# Patient Record
Sex: FEMALE | Race: WHITE | HISPANIC OR LATINO | URBAN - METROPOLITAN AREA
[De-identification: names, ages, dates, MRNs, and addresses within clinical notes are randomized per-mention and may not be internally consistent; named-entity substitution may affect disease eponyms.]

---

## 2022-06-17 ENCOUNTER — OFFICE (OUTPATIENT)
Dept: URBAN - METROPOLITAN AREA CLINIC 63 | Facility: CLINIC | Age: 67
Setting detail: OPHTHALMOLOGY
End: 2022-06-17
Payer: MEDICARE

## 2022-06-17 DIAGNOSIS — D31.31: ICD-10-CM

## 2022-06-17 DIAGNOSIS — H40.053: ICD-10-CM

## 2022-06-17 PROCEDURE — 92004 COMPRE OPH EXAM NEW PT 1/>: CPT | Performed by: SPECIALIST

## 2022-06-17 PROCEDURE — 92134 CPTRZ OPH DX IMG PST SGM RTA: CPT | Performed by: SPECIALIST

## 2022-06-17 ASSESSMENT — CONFRONTATIONAL VISUAL FIELD TEST (CVF)
OS_FINDINGS: FULL
OD_FINDINGS: FULL

## 2022-06-17 ASSESSMENT — REFRACTION_AUTOREFRACTION
OD_CYLINDER: -0.50
OS_AXIS: 175
OS_SPHERE: +2.00
OS_CYLINDER: -1.00
OD_AXIS: 85
OD_SPHERE: +2.25

## 2022-06-17 ASSESSMENT — REFRACTION_CURRENTRX
OD_OVR_VA: 20/
OD_SPHERE: +2.25
OS_OVR_VA: 20/
OS_AXIS: 154
OD_CYLINDER: -0.25
OS_CYLINDER: -0.50
OS_SPHERE: +1.75
OD_AXIS: 70

## 2022-06-17 ASSESSMENT — SPHEQUIV_DERIVED
OD_SPHEQUIV: 2
OS_SPHEQUIV: 1.5

## 2022-06-17 ASSESSMENT — TONOMETRY
OS_IOP_MMHG: 19
OD_IOP_MMHG: 19

## 2022-06-17 ASSESSMENT — VISUAL ACUITY
OD_BCVA: 20/20
OS_BCVA: 20/20

## 2022-07-11 ENCOUNTER — OFFICE (OUTPATIENT)
Dept: URBAN - METROPOLITAN AREA CLINIC 94 | Facility: CLINIC | Age: 67
Setting detail: OPHTHALMOLOGY
End: 2022-07-11
Payer: MEDICARE

## 2022-07-11 DIAGNOSIS — D31.31: ICD-10-CM

## 2022-07-11 PROCEDURE — 92012 INTRM OPH EXAM EST PATIENT: CPT | Performed by: SPECIALIST

## 2022-07-11 ASSESSMENT — TONOMETRY
OS_IOP_MMHG: 16
OD_IOP_MMHG: 19

## 2022-07-11 ASSESSMENT — REFRACTION_AUTOREFRACTION
OS_SPHERE: +2.00
OS_AXIS: 175
OD_CYLINDER: -0.50
OS_CYLINDER: -1.00
OD_SPHERE: +2.25
OD_AXIS: 85

## 2022-07-11 ASSESSMENT — VISUAL ACUITY
OS_BCVA: 20/20
OD_BCVA: 20/20

## 2022-07-11 ASSESSMENT — SPHEQUIV_DERIVED
OD_SPHEQUIV: 2
OS_SPHEQUIV: 1.5

## 2023-05-01 ENCOUNTER — APPOINTMENT (OUTPATIENT)
Dept: ORTHOPEDIC SURGERY | Facility: CLINIC | Age: 68
End: 2023-05-01

## 2023-05-01 DIAGNOSIS — M25.562 PAIN IN LEFT KNEE: ICD-10-CM

## 2023-05-02 ENCOUNTER — APPOINTMENT (OUTPATIENT)
Dept: ORTHOPEDIC SURGERY | Facility: CLINIC | Age: 68
End: 2023-05-02
Payer: MEDICARE

## 2023-05-02 VITALS
HEART RATE: 63 BPM | BODY MASS INDEX: 19.49 KG/M2 | DIASTOLIC BLOOD PRESSURE: 67 MMHG | WEIGHT: 110 LBS | HEIGHT: 63 IN | SYSTOLIC BLOOD PRESSURE: 146 MMHG

## 2023-05-02 DIAGNOSIS — S83.249A OTHER TEAR OF MEDIAL MENISCUS, CURRENT INJURY, UNSPECIFIED KNEE, INITIAL ENCOUNTER: ICD-10-CM

## 2023-05-02 PROCEDURE — 99203 OFFICE O/P NEW LOW 30 MIN: CPT

## 2023-05-02 PROCEDURE — 73564 X-RAY EXAM KNEE 4 OR MORE: CPT | Mod: LT

## 2023-05-02 NOTE — HISTORY OF PRESENT ILLNESS
[Improving] : improving [Intermit.] : ~He/She~ states the symptoms seem to be intermittent [Running] : worsened by running [Walking] : worsened by walking [Acetaminophen] : relieved by acetaminophen [NSAIDs] : relieved by nonsteroidal anti-inflammatory drugs [Physical Therapy] : relieved by physical therapy [de-identified] : 7-year-old female with past medical history of anxiety, thyroid disease, hyperlipidemia presents to the office for initial evaluation of left knee pain x2 weeks.  The patient states her pain initially started in her left calf while running in March but about 2 weeks ago she was running up the stairs and twisted her left knee.  He had severe pain with weightbearing and walking so she used crutches for 2 days.  Her pain is located on the lateral aspect of the knee.  She has been taking Tylenol and anti-inflammatories for the pain with good relief.  States that she was attending physical therapy for her calf pain but when she injured her knee on the stairs she stopped attending.  Her pain has significantly improved since the initial injury.  She has not had any injections or surgeries on the knee.  She did cancel a trip to [facility] about 2 weeks ago because of the injury and would like to attempt that trip if cleared.  Denies any locking catching or buckling of the knee, swelling of the knee, numbness/tingling lower extremity, radiation of pain to the hip or back.\par \par Denies a history of smoking drug or alcohol use.

## 2023-05-02 NOTE — ADDENDUM
[FreeTextEntry1] : This note was written by Tatiana Maradiaga, acting as the  for Dr. Benoit. This note accurately reflects the work and decisions made by Dr. Benoit. [employer]

## 2023-05-02 NOTE — PHYSICAL EXAM
[de-identified] : GENERAL APPEARANCE: Well nourished and hydrated, pleasant, alert, and oriented x 3. Appears their stated age. \par HEENT: Normocephalic, extraocular eye motion intact. Nasal septum midline. Oral cavity clear. External auditory canal clear. \par RESPIRATORY: Breath sounds clear and audible in all lobes. No wheezing, No accessory muscle use.\par CARDIOVASCULAR: No apparent abnormalities. No lower leg edema. No varicosities. Pedal pulses are palpable.\par NEUROLOGIC: Sensation is normal, no muscle weakness in the upper or lower extremities.\par DERMATOLOGIC: No apparent skin lesions, moist, warm, no rash.\par SPINE: Cervical spine appears normal and moves freely; thoracic spine appears normal and moves freely; lumbosacral spine appears normal and moves freely, normal, nontender.\par MUSCULOSKELETAL: Hands, wrists, and elbows are normal and move freely, shoulders are normal and move freely. \par Psychiatric: Oriented to person, place, and time, insight and judgement were intact and the affect was normal.\par \par Musculoskeletal:\par Left knee exam shows mild effusion, ROM is 0-1 30 degrees, no instability, no pain with Sindhu, and no joint line tenderness.  There is mild tenderness palpation over the distal IT band and proximal lateral calf\par 5/5 motor strength in bilateral lower extremities. Sensory: Intact in bilateral lower extremities. DTRs: Biceps, brachioradialis, triceps, patellar, ankle and plantar 2+ and symmetric bilaterally. Pulses: dorsalis pedis, posterior tibial, femoral, popliteal, and radial 2+ and symmetric bilaterally. [de-identified] : 4 views of the left knee obtained the office today show no acute fracture or dislocation.  No significant degenerative changes noted.\par \par MRI of the left calf dated 4/25/2023 shows oblique tear in the posterior horn of the medial meniscus moderate knee joint effusion no fracture or acute osseous injury no muscle tear or muscle edema

## 2023-05-02 NOTE — DISCUSSION/SUMMARY
[de-identified] : Patient is a 67-year-old female with left knee and calf pain presenting today for initial evaluation.  She is having very good improvement in her pain with conservative treatment.  I recommend she continue with that at this time.  Of note her pain is located laterally and she does have a small tear of her medial meniscus.  I do not think that that is what is causing her the issue at this time.  She should continue take her anti-inflammatories.  She will continue with low impact activity and exercises.  I will see her back on an as-needed basis for her left knee.  All questions were asked and answered

## 2023-05-17 ENCOUNTER — OFFICE (OUTPATIENT)
Dept: URBAN - METROPOLITAN AREA CLINIC 63 | Facility: CLINIC | Age: 68
Setting detail: OPHTHALMOLOGY
End: 2023-05-17
Payer: MEDICARE

## 2023-05-17 ENCOUNTER — APPOINTMENT (OUTPATIENT)
Dept: ORTHOPEDIC SURGERY | Facility: CLINIC | Age: 68
End: 2023-05-17

## 2023-05-17 DIAGNOSIS — D31.31: ICD-10-CM

## 2023-05-17 PROCEDURE — 92250 FUNDUS PHOTOGRAPHY W/I&R: CPT | Performed by: SPECIALIST

## 2023-05-17 PROCEDURE — 92014 COMPRE OPH EXAM EST PT 1/>: CPT | Performed by: SPECIALIST

## 2023-05-17 ASSESSMENT — REFRACTION_AUTOREFRACTION
OD_AXIS: 85
OS_AXIS: 175
OD_CYLINDER: -0.50
OS_CYLINDER: -1.00
OS_SPHERE: +2.00
OD_SPHERE: +2.25

## 2023-05-17 ASSESSMENT — VISUAL ACUITY
OS_BCVA: 20/20
OD_BCVA: 20/20

## 2023-05-17 ASSESSMENT — CONFRONTATIONAL VISUAL FIELD TEST (CVF)
OD_FINDINGS: FULL
OS_FINDINGS: FULL

## 2023-05-17 ASSESSMENT — SPHEQUIV_DERIVED
OS_SPHEQUIV: 1.5
OD_SPHEQUIV: 2

## 2023-08-07 ENCOUNTER — RESULT CHARGE (OUTPATIENT)
Age: 68
End: 2023-08-07

## 2023-08-08 ENCOUNTER — APPOINTMENT (OUTPATIENT)
Dept: VASCULAR SURGERY | Facility: CLINIC | Age: 68
End: 2023-08-08
Payer: MEDICARE

## 2023-08-08 VITALS
HEART RATE: 60 BPM | DIASTOLIC BLOOD PRESSURE: 77 MMHG | WEIGHT: 110 LBS | BODY MASS INDEX: 19.49 KG/M2 | SYSTOLIC BLOOD PRESSURE: 134 MMHG | HEIGHT: 63 IN

## 2023-08-08 DIAGNOSIS — I25.10 ATHEROSCLEROTIC HEART DISEASE OF NATIVE CORONARY ARTERY W/OUT ANGINA PECTORIS: ICD-10-CM

## 2023-08-08 DIAGNOSIS — F41.9 ANXIETY DISORDER, UNSPECIFIED: ICD-10-CM

## 2023-08-08 PROCEDURE — 99204 OFFICE O/P NEW MOD 45 MIN: CPT

## 2023-08-09 RX ORDER — ALPRAZOLAM 0.25 MG/1
0.25 TABLET ORAL
Qty: 30 | Refills: 0 | Status: ACTIVE | COMMUNITY
Start: 2023-08-09 | End: 1900-01-01

## 2023-08-09 RX ORDER — LEVOTHYROXINE SODIUM 75 UG/1
75 CAPSULE ORAL
Refills: 0 | Status: ACTIVE | COMMUNITY

## 2023-08-09 RX ORDER — PSYLLIUM HUSK 0.4 G
CAPSULE ORAL
Refills: 0 | Status: ACTIVE | COMMUNITY

## 2023-08-09 RX ORDER — ASCORBIC ACID 500 MG
TABLET ORAL
Refills: 0 | Status: ACTIVE | COMMUNITY

## 2023-08-09 RX ORDER — CHROMIUM 200 MCG
TABLET ORAL
Refills: 0 | Status: ACTIVE | COMMUNITY

## 2023-08-09 RX ORDER — LORAZEPAM 0.5 MG/1
0.5 TABLET ORAL
Qty: 30 | Refills: 0 | Status: DISCONTINUED | COMMUNITY
Start: 2023-08-08 | End: 2023-08-09

## 2023-08-09 RX ORDER — ATORVASTATIN CALCIUM 10 MG/1
10 TABLET, FILM COATED ORAL
Refills: 0 | Status: ACTIVE | COMMUNITY

## 2023-08-09 RX ORDER — ALPRAZOLAM 2 MG/1
TABLET ORAL
Refills: 0 | Status: ACTIVE | COMMUNITY

## 2023-08-09 NOTE — HISTORY OF PRESENT ILLNESS
[FreeTextEntry1] : 67yoF PMHx of HLD, hypothyroidism, no smoking hx, referred by Dr. 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  Imaging also notes varices in the area.  Pt denies any gross hematura or abdominal pain at this time.

## 2023-08-09 NOTE — PHYSICAL EXAM
[JVD] : no jugular venous distention  [Normal Thyroid] : the thyroid was normal [Carotid Bruits] : no carotid bruits [Normal Breath Sounds] : Normal breath sounds [Respiratory Effort] : normal respiratory effort [Normal Heart Sounds] : normal heart sounds [Normal Rate and Rhythm] : normal rate and rhythm [Right Carotid Bruit] : no bruit heard over the right carotid [Left Carotid Bruit] : no bruit heard over the left carotid [2+] : left 2+ [Ankle Swelling (On Exam)] : not present [Abdomen Masses] : No abdominal masses [Abdomen Tenderness] : ~T ~M No abdominal tenderness [No Rash or Lesion] : No rash or lesion [Purpura] : no purpura  [Petechiae] : no petechiae [Skin Ulcer] : no ulcer [Skin Induration] : no induration [Alert] : alert [Calm] : calm [de-identified] : Healthy appearance, thin habitus [de-identified] : NC/AT, anicteric [de-identified] : No flank or CVA tenderness appreciated [de-identified] : FROM throughout, strength 5/5x4

## 2023-08-09 NOTE — ASSESSMENT
[FreeTextEntry1] : 67yoF PMHx of HLD, hypothyroidism, no smoking hx, referred by Dr. 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  Imaging also notes varices in the area.  Pt denies any gross hematura or abdominal pain at this time.  Discussed options for repair of pt's renal aneurysm/AVF, including endovascular treatment/embolization of the vessel v open repair/ligation.  Pt/ aware of risks/benefits of all options and is in agreement w/plan to evaluate the AVF w/renal angiogram, possible embolization.  Will schedule procedure in the cath lab for 2wks.

## 2023-08-23 ENCOUNTER — TRANSCRIPTION ENCOUNTER (OUTPATIENT)
Age: 68
End: 2023-08-23

## 2023-08-24 ENCOUNTER — TRANSCRIPTION ENCOUNTER (OUTPATIENT)
Age: 68
End: 2023-08-24

## 2023-08-24 ENCOUNTER — INPATIENT (INPATIENT)
Facility: HOSPITAL | Age: 68
LOS: 0 days | Discharge: HOME | DRG: 271 | End: 2023-08-25
Attending: SURGERY | Admitting: SURGERY
Payer: MEDICARE

## 2023-08-24 VITALS — HEIGHT: 63 IN

## 2023-08-24 LAB
ANION GAP SERPL CALC-SCNC: 6 MMOL/L — SIGNIFICANT CHANGE UP (ref 5–17)
BUN SERPL-MCNC: 16 MG/DL — SIGNIFICANT CHANGE UP (ref 7–23)
CALCIUM SERPL-MCNC: 9.2 MG/DL — SIGNIFICANT CHANGE UP (ref 8.4–10.5)
CHLORIDE SERPL-SCNC: 106 MMOL/L — SIGNIFICANT CHANGE UP (ref 96–108)
CO2 SERPL-SCNC: 28 MMOL/L — SIGNIFICANT CHANGE UP (ref 22–31)
CREAT SERPL-MCNC: 0.83 MG/DL — SIGNIFICANT CHANGE UP (ref 0.5–1.3)
EGFR: 77 ML/MIN/1.73M2 — SIGNIFICANT CHANGE UP
GLUCOSE SERPL-MCNC: 93 MG/DL — SIGNIFICANT CHANGE UP (ref 70–99)
HCT VFR BLD CALC: 38.4 % — SIGNIFICANT CHANGE UP (ref 34.5–45)
HGB BLD-MCNC: 12.3 G/DL — SIGNIFICANT CHANGE UP (ref 11.5–15.5)
MAGNESIUM SERPL-MCNC: 2 MG/DL — SIGNIFICANT CHANGE UP (ref 1.6–2.6)
MCHC RBC-ENTMCNC: 29.9 PG — SIGNIFICANT CHANGE UP (ref 27–34)
MCHC RBC-ENTMCNC: 32 GM/DL — SIGNIFICANT CHANGE UP (ref 32–36)
MCV RBC AUTO: 93.4 FL — SIGNIFICANT CHANGE UP (ref 80–100)
NRBC # BLD: 0 /100 WBCS — SIGNIFICANT CHANGE UP (ref 0–0)
PHOSPHATE SERPL-MCNC: 3.9 MG/DL — SIGNIFICANT CHANGE UP (ref 2.5–4.5)
PLATELET # BLD AUTO: 170 K/UL — SIGNIFICANT CHANGE UP (ref 150–400)
POTASSIUM SERPL-MCNC: 4.2 MMOL/L — SIGNIFICANT CHANGE UP (ref 3.5–5.3)
POTASSIUM SERPL-SCNC: 4.2 MMOL/L — SIGNIFICANT CHANGE UP (ref 3.5–5.3)
RBC # BLD: 4.11 M/UL — SIGNIFICANT CHANGE UP (ref 3.8–5.2)
RBC # FLD: 13.7 % — SIGNIFICANT CHANGE UP (ref 10.3–14.5)
SODIUM SERPL-SCNC: 140 MMOL/L — SIGNIFICANT CHANGE UP (ref 135–145)
WBC # BLD: 6.02 K/UL — SIGNIFICANT CHANGE UP (ref 3.8–10.5)
WBC # FLD AUTO: 6.02 K/UL — SIGNIFICANT CHANGE UP (ref 3.8–10.5)

## 2023-08-24 PROCEDURE — 36010 PLACE CATHETER IN VEIN: CPT | Mod: GC,LT

## 2023-08-24 PROCEDURE — 76937 US GUIDE VASCULAR ACCESS: CPT | Mod: 26,GC

## 2023-08-24 PROCEDURE — 36247 INS CATH ABD/L-EXT ART 3RD: CPT | Mod: GC,LT

## 2023-08-24 PROCEDURE — 37242 VASC EMBOLIZE/OCCLUDE ARTERY: CPT | Mod: GC

## 2023-08-24 RX ORDER — ATORVASTATIN CALCIUM 80 MG/1
1 TABLET, FILM COATED ORAL
Refills: 0 | DISCHARGE

## 2023-08-24 RX ORDER — OXYCODONE AND ACETAMINOPHEN 5; 325 MG/1; MG/1
2 TABLET ORAL EVERY 6 HOURS
Refills: 0 | Status: DISCONTINUED | OUTPATIENT
Start: 2023-08-24 | End: 2023-08-25

## 2023-08-24 RX ORDER — ACETAMINOPHEN 500 MG
2 TABLET ORAL
Qty: 0 | Refills: 0 | DISCHARGE
Start: 2023-08-24

## 2023-08-24 RX ORDER — CHLORHEXIDINE GLUCONATE 213 G/1000ML
1 SOLUTION TOPICAL ONCE
Refills: 0 | Status: DISCONTINUED | OUTPATIENT
Start: 2023-08-24 | End: 2023-08-25

## 2023-08-24 RX ORDER — ATORVASTATIN CALCIUM 80 MG/1
10 TABLET, FILM COATED ORAL AT BEDTIME
Refills: 0 | Status: DISCONTINUED | OUTPATIENT
Start: 2023-08-24 | End: 2023-08-25

## 2023-08-24 RX ORDER — LEVOTHYROXINE SODIUM 125 MCG
75 TABLET ORAL DAILY
Refills: 0 | Status: DISCONTINUED | OUTPATIENT
Start: 2023-08-25 | End: 2023-08-25

## 2023-08-24 RX ORDER — OXYCODONE AND ACETAMINOPHEN 5; 325 MG/1; MG/1
1 TABLET ORAL EVERY 4 HOURS
Refills: 0 | Status: DISCONTINUED | OUTPATIENT
Start: 2023-08-24 | End: 2023-08-25

## 2023-08-24 RX ORDER — ALPRAZOLAM 0.25 MG
0.5 TABLET ORAL AT BEDTIME
Refills: 0 | Status: DISCONTINUED | OUTPATIENT
Start: 2023-08-24 | End: 2023-08-25

## 2023-08-24 RX ORDER — CEFAZOLIN SODIUM 1 G
1000 VIAL (EA) INJECTION EVERY 8 HOURS
Refills: 0 | Status: COMPLETED | OUTPATIENT
Start: 2023-08-24 | End: 2023-08-25

## 2023-08-24 RX ORDER — ALPRAZOLAM 0.25 MG
0.25 TABLET ORAL EVERY 6 HOURS
Refills: 0 | Status: DISCONTINUED | OUTPATIENT
Start: 2023-08-24 | End: 2023-08-24

## 2023-08-24 RX ORDER — LEVOTHYROXINE SODIUM 125 MCG
1 TABLET ORAL
Refills: 0 | DISCHARGE

## 2023-08-24 RX ORDER — ACETAMINOPHEN 500 MG
650 TABLET ORAL EVERY 6 HOURS
Refills: 0 | Status: DISCONTINUED | OUTPATIENT
Start: 2023-08-24 | End: 2023-08-25

## 2023-08-24 RX ORDER — SODIUM CHLORIDE 9 MG/ML
1000 INJECTION INTRAMUSCULAR; INTRAVENOUS; SUBCUTANEOUS
Refills: 0 | Status: DISCONTINUED | OUTPATIENT
Start: 2023-08-24 | End: 2023-08-24

## 2023-08-24 RX ORDER — APIXABAN 2.5 MG/1
10 TABLET, FILM COATED ORAL EVERY 12 HOURS
Refills: 0 | Status: DISCONTINUED | OUTPATIENT
Start: 2023-08-24 | End: 2023-08-25

## 2023-08-24 RX ORDER — ALPRAZOLAM 0.25 MG
1 TABLET ORAL
Refills: 0 | DISCHARGE

## 2023-08-24 RX ADMIN — Medication 100 MILLIGRAM(S): at 17:46

## 2023-08-24 RX ADMIN — APIXABAN 10 MILLIGRAM(S): 2.5 TABLET, FILM COATED ORAL at 11:19

## 2023-08-24 RX ADMIN — SODIUM CHLORIDE 75 MILLILITER(S): 9 INJECTION INTRAMUSCULAR; INTRAVENOUS; SUBCUTANEOUS at 11:20

## 2023-08-24 RX ADMIN — APIXABAN 10 MILLIGRAM(S): 2.5 TABLET, FILM COATED ORAL at 17:46

## 2023-08-24 NOTE — PATIENT PROFILE ADULT - FALL HARM RISK - HARM RISK INTERVENTIONS

## 2023-08-24 NOTE — DISCHARGE NOTE PROVIDER - NSDCMRMEDTOKEN_GEN_ALL_CORE_FT
acetaminophen 325 mg oral tablet: 2 tab(s) orally every 6 hours As needed Mild Pain (1 - 3)  Eliquis Starter Pack for Treatment of DVT and PE 5 mg oral tablet: 1 tab(s) orally 2 times a day please follow package directions  levothyroxine 75 mcg (0.075 mg) oral tablet: 1 orally once a day  Lipitor 10 mg oral tablet: 1 orally every other day  Multiple Vitamins oral tablet: 1 tab(s) orally once a day  Xanax 0.25 mg oral tablet: 1 orally every 4 to 6 hours as needed for  anxiety

## 2023-08-24 NOTE — DISCHARGE NOTE PROVIDER - CARE PROVIDER_API CALL
Sandro Rincon  Vascular Surgery  [address]  [place] [address]  Phone: [phone]  Fax: [phone]  Follow Up Time: 1 week   Sandro Rincon  Vascular Surgery  [address]  [place] [address]  Phone: [phone]  Fax: [phone]  Follow Up Time: 2 weeks

## 2023-08-24 NOTE — BRIEF OPERATIVE NOTE - OPERATION/FINDINGS
Procedure: Aortogram, L renal angiogram, L renal venogram, L renal AVM embolization with two 16 amplatzer plugs  Indication: L renal AVM  Description: R CFA access obtained, 7Fr sheath, perclosed x 1. R CFV access obtained, 12 Fr, manual pressure. Aortogram showed L renal AVM. Two 16 amplatzer plugs placed with good result. Completion showed filling of her left kidney still.  Pulses: palpable  Contrast: 74cc  IVF: 600cc  EBL: 20

## 2023-08-24 NOTE — H&P ADULT - HISTORY OF PRESENT ILLNESS
Attending: MELANIE AriasY, 67y, Female  MRN:  [id number]      HPI:    68 y/o F with HLD, hypothyroidism presenting with possible L renal artery aneurysm found as an outpatient and an AVF. SHe denies any abdominal pain, back pain, hematuria, dysuria, SOB, c/p.          PAST MEDICAL & SURGICAL HISTORY:      Home Medications:          PHYSICAL EXAM:  General: NAD, resting comfortably  Pulmonary: Nonlabored breathing, no respiratory distress  Cardiovascular: RRR  Abdominal: soft, NT, ND  Extremities: warm and well perfused     Pulse Exam:  - Right:  ----- DP:  palp  ----- PT: palp  ----- POP: palp  ----- FEM: palp  Left:   ----- DP: palp  ----- PT: palp  ----- POP: palp  ----- FEM: palp        LABS:                    CAPILLARY BLOOD GLUCOSE        CAPILLARY BLOOD GLUCOSE            Microbiology:        RADIOLOGY & ADDITIONAL STUDIES:

## 2023-08-24 NOTE — PROGRESS NOTE ADULT - SUBJECTIVE AND OBJECTIVE BOX
POST-OPERATIVE NOTE    Procedure: L renal angiogram/venogram and embolization of L renal AVM    Diagnosis/Indication: L renal AVM    Surgeon: Dr. Rincon    S: Pt has no complaints. Denies CP, SOB, N/V. Pain controlled with medication.    O:  T(C): --  T(F): --  HR: 43 (08-24-23 @ 12:05) (43 - 43)  BP: 148/66 (08-24-23 @ 12:05) (148/66 - 148/66)  RR: 18 (08-24-23 @ 12:05) (18 - 18)  SpO2: 94% (08-24-23 @ 12:05) (94% - 94%)  Wt(kg): --            Gen: NAD, resting comfortably in bed  C/V: NSR  Pulm: Nonlabored breathing, no respiratory distress, on room air  Abd: soft, NT/ND  Extrem: Right groin dressing c/d/i, groin soft, nontender, no palpable hematoma      A/P: 67y Female s/p L renal angiogram/venogram and embolization of L renal AVM  Diet: regular  IVF: NS @75 cc/hr  Pain/nausea control   POST-OPERATIVE NOTE    Procedure: L renal angiogram/venogram and embolization of L renal AVM    Diagnosis/Indication: L renal AVM    Surgeon: Dr. Rincon    S: Pt has no complaints. Denies CP, SOB, N/V. Pain controlled with medication.    O:  T(C): --  T(F): --  HR: 43 (08-24-23 @ 12:05) (43 - 43)  BP: 148/66 (08-24-23 @ 12:05) (148/66 - 148/66)  RR: 18 (08-24-23 @ 12:05) (18 - 18)  SpO2: 94% (08-24-23 @ 12:05) (94% - 94%)  Wt(kg): --            Gen: NAD, resting comfortably in bed  C/V: bradycardic  Pulm: Nonlabored breathing, no respiratory distress, on room air  Abd: soft, NT/ND  Extrem: Right groin dressing c/d/i, groin soft, nontender, no palpable hematoma  Pulses: b/l DP/PT palpable 2+    A/P: 67y Female s/p L renal angiogram/venogram and embolization of L renal AVM  Diet: regular  IVF: NS @75 cc/hr  Pain/nausea control  Eliquis

## 2023-08-24 NOTE — DISCHARGE NOTE PROVIDER - HOSPITAL COURSE
68 y/o F with HLD, hypothyroidism presenting with possible L renal artery aneurysm found as an outpatient and an AVF. SHe denies any abdominal pain, back pain, hematuria, dysuria, SOB, c/p. She is now s/p Renal angiogram and venogram and amplatzer embolization of AVM.    Hospital course: Now s/p Renal angiogram and venogram and embolization of Left renal AVM. Postoperatively____ 66 y/o F with HLD, hypothyroidism presenting with possible L renal artery aneurysm found as an outpatient and an AVF. SHe denies any abdominal pain, back pain, hematuria, dysuria, SOB, c/p. She is now s/p Renal angiogram and venogram and amplatzer embolization of AVM.    Hospital course: Now s/p Renal angiogram and venogram and embolization of Left renal AVM. Postoperatively patient did well.    Today patient is feeling well, all the VS and blood work is within normal limits, the pain is well controlled on oral pain medication, tolerating diet without nausea, and ambulating independently - patient is stable and ready for discharge today. 68 y/o F with HLD, hypothyroidism presenting with possible L renal artery aneurysm found as an outpatient and an AVF. SHe denies any abdominal pain, back pain, hematuria, dysuria, SOB, c/p. She is now s/p Renal angiogram and venogram and amplatzer embolization of Left renal AVM. Postoperatively patient did well.    Today patient is feeling well, all the VS and blood work is within normal limits, the pain is well controlled on oral pain medication, tolerating diet without nausea, and ambulating independently - patient is stable and ready for discharge today.

## 2023-08-24 NOTE — DISCHARGE NOTE PROVIDER - PROVIDER TOKENS
PROVIDER:[TOKEN:[[id number]],FOLLOWUP:[1 week]] PROVIDER:[TOKEN:[[id number]],FOLLOWUP:[2 weeks]]

## 2023-08-24 NOTE — DISCHARGE NOTE PROVIDER - NSDCCPTREATMENT_GEN_ALL_CORE_FT
PRINCIPAL PROCEDURE  Procedure: Embolization, aneurysm or AV malformation  Findings and Treatment:       SECONDARY PROCEDURE  Procedure: Angiogram, renal, left  Findings and Treatment:

## 2023-08-24 NOTE — PROGRESS NOTE ADULT - SUBJECTIVE AND OBJECTIVE BOX
Vascular Surgery Post-Op Note    Procedure: s/p L renal angiogram/venogram + embolization of L renal AVM via R groin access     Diagnosis/Indication: Left renal AVM    Surgeon: Dr. Rincon    S: Pt has no complaints. Denies CP, SOB, MEEK, calf tenderness. No abdominal or back pain.     O:  T(C): --  T(F): --  HR: 43 (08-24-23 @ 12:05) (43 - 43)  BP: 148/66 (08-24-23 @ 12:05) (148/66 - 148/66)  RR: 18 (08-24-23 @ 12:05) (18 - 18)  SpO2: 94% (08-24-23 @ 12:05) (94% - 94%)  Wt(kg): --            Gen: NAD, resting comfortably in bed  C/V: NSR  Pulm: Nonlabored breathing, no respiratory distress  Abd: soft, NT/ND  Groin: R groin access site dressing C/D/I, no hematoma, no bleeding, non tender  Extrem: 2+DP/PT pulses blat, calfs soft and non tender, no edema      A/P: 67yFemale s/p above procedure  Diet: Regular  IVF   Pain/nausea control  DVT ppx: Eliquis  Dispo plan: d/c tomorrow if VS stable

## 2023-08-24 NOTE — BRIEF OPERATIVE NOTE - NSICDXBRIEFPROCEDURE_GEN_ALL_CORE_FT
PROCEDURES:  Angiogram, renal, left 24-Aug-2023 10:33:26  Liss Barkley  Embolization, aneurysm or AV malformation 24-Aug-2023 10:34:04  Liss Barkley

## 2023-08-24 NOTE — H&P ADULT - ASSESSMENT
66 y/o F with HLD, hypothyroidism presenting with possible L renal artery aneurysm found as an outpatient and an AVF. She is now s/p Renal angiogram and venogram and amplatzer embolization of AVM.    #L renal AVM  - s/p embolization  - stat labs  - ancef x 2 doses  - IVF  - regular diet  - monitor Cr and Hgb  - will start eliquis BID as new medication  - pain meds    #HLD  - c/w statin    #hypothyroid  - c/w synthorid    DVT ppx: patient will be anticoagulated  Dispo: 5 [facility] tele

## 2023-08-24 NOTE — DISCHARGE NOTE PROVIDER - NSDCCPCAREPLAN_GEN_ALL_CORE_FT
PRINCIPAL DISCHARGE DIAGNOSIS  Diagnosis: AVM (arteriovenous malformation)  Assessment and Plan of Treatment: left renal      SECONDARY DISCHARGE DIAGNOSES  Diagnosis: Hyperlipidemia  Assessment and Plan of Treatment:     Diagnosis: Hypothyroidism  Assessment and Plan of Treatment:     Diagnosis: Anxiety  Assessment and Plan of Treatment:

## 2023-08-24 NOTE — DISCHARGE NOTE PROVIDER - NSDCFUADDINST_GEN_ALL_CORE_FT
FOLLOW UP: Dr. Rincon in 1 week. Your appointment has been made for _______. Call the office at  with any questions.    WOUND CARE: You may shower; soap and water over incision sites. Do not scrub. Pat dry when done.     ACTIVITY: Ambulate as tolerated, but no heavy lifting (>10lbs) or strenuous exercise.    Call the office if you experience increasing pain, redness, swelling or drainage from incision sites/wounds, or temperature >101.4F.     NEW MEDICATIONS: Eliquis starter pack (this is a blood thinner).    ADDITIONAL FOLLOW UP AFTER DISCHARGE: follow up with your PCP per routine.    DISCHARGE DESTINATION: Home.   FOLLOW UP: Dr. Rincon in 2 weeks. Your appointment has been made for _______. Call the office at  with any questions.    WOUND CARE: You may shower; soap and water over incision sites. Do not scrub. Pat dry when done.     ACTIVITY: Ambulate as tolerated, but no heavy lifting (>10lbs) or strenuous exercise.    Call the office if you experience increasing pain, redness, swelling or drainage from incision sites/wounds, or temperature >101.4F.     NEW MEDICATIONS: Eliquis starter pack (this is a blood thinner).    Please  the new medication at [facility] located in the lobby of the hospital.    ADDITIONAL FOLLOW UP AFTER DISCHARGE: follow up with your PCP per routine.    DISCHARGE DESTINATION: Home.

## 2023-08-25 ENCOUNTER — TRANSCRIPTION ENCOUNTER (OUTPATIENT)
Age: 68
End: 2023-08-25

## 2023-08-25 VITALS
SYSTOLIC BLOOD PRESSURE: 156 MMHG | HEART RATE: 49 BPM | TEMPERATURE: 98 F | RESPIRATION RATE: 17 BRPM | OXYGEN SATURATION: 96 % | DIASTOLIC BLOOD PRESSURE: 70 MMHG

## 2023-08-25 LAB
ANION GAP SERPL CALC-SCNC: 8 MMOL/L — SIGNIFICANT CHANGE UP (ref 5–17)
BUN SERPL-MCNC: 16 MG/DL — SIGNIFICANT CHANGE UP (ref 7–23)
CALCIUM SERPL-MCNC: 9.2 MG/DL — SIGNIFICANT CHANGE UP (ref 8.4–10.5)
CHLORIDE SERPL-SCNC: 107 MMOL/L — SIGNIFICANT CHANGE UP (ref 96–108)
CO2 SERPL-SCNC: 26 MMOL/L — SIGNIFICANT CHANGE UP (ref 22–31)
CREAT SERPL-MCNC: 0.82 MG/DL — SIGNIFICANT CHANGE UP (ref 0.5–1.3)
EGFR: 78 ML/MIN/1.73M2 — SIGNIFICANT CHANGE UP
GLUCOSE SERPL-MCNC: 92 MG/DL — SIGNIFICANT CHANGE UP (ref 70–99)
HCT VFR BLD CALC: 39.8 % — SIGNIFICANT CHANGE UP (ref 34.5–45)
HCV AB S/CO SERPL IA: 0.05 S/CO — SIGNIFICANT CHANGE UP
HCV AB SERPL-IMP: SIGNIFICANT CHANGE UP
HGB BLD-MCNC: 12.8 G/DL — SIGNIFICANT CHANGE UP (ref 11.5–15.5)
MAGNESIUM SERPL-MCNC: 1.9 MG/DL — SIGNIFICANT CHANGE UP (ref 1.6–2.6)
MCHC RBC-ENTMCNC: 29.9 PG — SIGNIFICANT CHANGE UP (ref 27–34)
MCHC RBC-ENTMCNC: 32.2 GM/DL — SIGNIFICANT CHANGE UP (ref 32–36)
MCV RBC AUTO: 93 FL — SIGNIFICANT CHANGE UP (ref 80–100)
NRBC # BLD: 0 /100 WBCS — SIGNIFICANT CHANGE UP (ref 0–0)
PHOSPHATE SERPL-MCNC: 3.6 MG/DL — SIGNIFICANT CHANGE UP (ref 2.5–4.5)
PLATELET # BLD AUTO: 169 K/UL — SIGNIFICANT CHANGE UP (ref 150–400)
POTASSIUM SERPL-MCNC: 3.9 MMOL/L — SIGNIFICANT CHANGE UP (ref 3.5–5.3)
POTASSIUM SERPL-SCNC: 3.9 MMOL/L — SIGNIFICANT CHANGE UP (ref 3.5–5.3)
RBC # BLD: 4.28 M/UL — SIGNIFICANT CHANGE UP (ref 3.8–5.2)
RBC # FLD: 13.3 % — SIGNIFICANT CHANGE UP (ref 10.3–14.5)
SODIUM SERPL-SCNC: 141 MMOL/L — SIGNIFICANT CHANGE UP (ref 135–145)
WBC # BLD: 8.18 K/UL — SIGNIFICANT CHANGE UP (ref 3.8–10.5)
WBC # FLD AUTO: 8.18 K/UL — SIGNIFICANT CHANGE UP (ref 3.8–10.5)

## 2023-08-25 PROCEDURE — 84100 ASSAY OF PHOSPHORUS: CPT

## 2023-08-25 PROCEDURE — 80048 BASIC METABOLIC PNL TOTAL CA: CPT

## 2023-08-25 PROCEDURE — C1894: CPT

## 2023-08-25 PROCEDURE — 86803 HEPATITIS C AB TEST: CPT

## 2023-08-25 PROCEDURE — 76000 FLUOROSCOPY <1 HR PHYS/QHP: CPT

## 2023-08-25 PROCEDURE — C1769: CPT

## 2023-08-25 PROCEDURE — 83735 ASSAY OF MAGNESIUM: CPT

## 2023-08-25 PROCEDURE — C1760: CPT

## 2023-08-25 PROCEDURE — 36415 COLL VENOUS BLD VENIPUNCTURE: CPT

## 2023-08-25 PROCEDURE — C1887: CPT

## 2023-08-25 PROCEDURE — C1889: CPT

## 2023-08-25 PROCEDURE — C1725: CPT

## 2023-08-25 PROCEDURE — 85027 COMPLETE CBC AUTOMATED: CPT

## 2023-08-25 RX ORDER — APIXABAN 2.5 MG/1
1 TABLET, FILM COATED ORAL
Qty: 74 | Refills: 0
Start: 2023-08-25 | End: 2023-09-23

## 2023-08-25 RX ADMIN — Medication 100 MILLIGRAM(S): at 00:06

## 2023-08-25 RX ADMIN — Medication 0.5 MILLIGRAM(S): at 00:25

## 2023-08-25 RX ADMIN — Medication 75 MICROGRAM(S): at 05:35

## 2023-08-25 RX ADMIN — APIXABAN 10 MILLIGRAM(S): 2.5 TABLET, FILM COATED ORAL at 06:52

## 2023-08-25 NOTE — DISCHARGE NOTE NURSING/CASE MANAGEMENT/SOCIAL WORK - FLU SEASON?
HISTORY:    70-year-old male with a history of venous insufficiency status post left lower extremity GSV ablation '23, bilateral lower extremity lymphedema, diabetes, hypertension, hyperlipidemia, obesity, and DAVID presenting for follow-up.      The patient usually follows in vascular clinic with Dr. Ashby and is followed by Dr. Linn in Cardiology Clinic.     He comes in today for vascular med follow-up post post LLE GSV 2/23.    He has a h/o B LE edema for years managed with daily compression stocking use (8+ hours/day for 7 years plus) and furosemide 40x1. Edema is minimal in the morning and progresses throughout the day. Sometimes he has trouble putting shoes on. Compression stockings and diuretic therapy as well as lifestyle modifications help significantly.     He has a h/o LLE ulcers that have healed. No recurrences post ablation.    The patient denies any previous history of myocardial infarction, coronary artery disease, peripheral arterial disease, stroke, congestive heart failure, or cardiomyopathy.       MEDICATIONS:      Current Outpatient Medications:     allopurinoL (ZYLOPRIM) 300 MG tablet, TAKE 1 TABLET(300 MG) BY MOUTH EVERY DAY, Disp: 90 tablet, Rfl: 3    anastrozole (ARIMIDEX) 1 mg Tab, Take 1 mg by mouth once daily., Disp: , Rfl:     aspirin (ECOTRIN) 81 MG EC tablet, Take 81 mg by mouth once daily., Disp: , Rfl:     atorvastatin (LIPITOR) 40 MG tablet, Take 1 tablet (40 mg total) by mouth every evening. For cholesterol, Disp: 90 tablet, Rfl: 3    clotrimazole-betamethasone 1-0.05% (LOTRISONE) cream, Apply topically once daily. To feet and toenails, Disp: 45 g, Rfl: 1    furosemide (LASIX) 40 MG tablet, Take 1 tablet (40 mg total) by mouth 2 (two) times a day., Disp: 90 tablet, Rfl: 3    HYDROcodone-acetaminophen (NORCO)  mg per tablet, Take 1 tablet by mouth., Disp: , Rfl:     hydrocodone-acetaminophen 5-325mg (NORCO) 5-325 mg per tablet, TK 1 T PO  Q 6 TO 8 H, Disp: , Rfl: 0     ibuprofen (ADVIL,MOTRIN) 600 MG tablet, TAKE 1 TABLET(600 MG) BY MOUTH EVERY 6 HOURS WITH FOOD AS NEEDED FOR PAIN OR INFLAMMATION, Disp: 90 tablet, Rfl: 2    metFORMIN (GLUCOPHAGE-XR) 500 MG ER 24hr tablet, Take 1 tablet (500 mg total) by mouth 2 (two) times daily with meals., Disp: 180 tablet, Rfl: 3    naproxen (NAPROSYN) 500 MG tablet, Take 500 mg by mouth 2 (two) times daily as needed., Disp: , Rfl:     olmesartan (BENICAR) 40 MG tablet, Take 1 tablet (40 mg total) by mouth once daily. Blood pressure, Disp: 90 tablet, Rfl: 3    pregabalin (LYRICA) 100 MG capsule, Take 1 capsule (100 mg total) by mouth 3 (three) times daily., Disp: 90 capsule, Rfl: 1    semaglutide (OZEMPIC) 1 mg/dose (4 mg/3 mL), Inject 1 mg into the skin every 7 days., Disp: 4 pen, Rfl: 2    sildenafiL (VIAGRA) 100 MG tablet, Take 1 tablet (100 mg total) by mouth daily as needed for Erectile Dysfunction (take on an empty stomach 1 hour prior to sex)., Disp: 10 tablet, Rfl: 11    tamsulosin (FLOMAX) 0.4 mg Cap, Take 1 capsule (0.4 mg total) by mouth every morning., Disp: 90 capsule, Rfl: 3      PHYSICAL EXAM:    NAD, A+Ox3.  Breathing comfortably  Stockings on.     LABS/STUDIES (imaging reviewed during clinic visit):    June 2022 hemoglobin 13.9.  Creatinine 0.9 with BUN of 15.  LDL 59 and HDL 54.  A1c 7.1.  ECG 2021 demonstrates sinus rhythm with no Q-waves or ST changes.  Right bundle-branch block.    ARIANA 2021 0.73/0.76.  Normal TBIs bilaterally.  TTE 2021 Nml LV size and fxn. CVP 3.   PET stress 2021 No e/o ischemia. Nml LVEF.  Arterial duplex June 2022 normal ARIANA bilaterally with no evidence of significant stenosis.  Venous duplex February 2023 status post successful left GSV ablation with no evidence of left lower extremity DVT.  June 2022 shows no evidence of DVT bilaterally.  2021 shows no evidence of DVT with wide opne bilateral GSV and left SSV reflux.    ASSESSMENT & PLAN:    1. Chronic venous insufficiency    2. Primary hypertension     3. Mixed hyperlipidemia    4. Morbid obesity    5. Type 2 diabetes mellitus without complication, without long-term current use of insulin                  C5, Ep, As, Pr LLE. C4b RLE. S/p successful LLE GSV ablation with improvement in LLE symptoms. Pt happy with result. Still has L SSV and R GSV reflux, but feels comfortable continuing with diuretic therapy and compression stockings.     Average Bps usually better controlled on olmesartan 40x1 and furosemide 40x1. Stopping amlodipine helped improve swelling. If Bps remain up can try beta blocker or spironolactone.     LDL at goal on atorvastatin 40x1.      Must lose weight. Discussed importance of this.     Pt can follow-up with me or Dr. Ashby moving forward.     Shaheen Garcia MD       No

## 2023-08-25 NOTE — PROGRESS NOTE ADULT - SUBJECTIVE AND OBJECTIVE BOX
{\rtf1\pnnnfq06399\ansi\rkytqzk7835\ftnbj\uc1\deff0  {\fonttbl{\f0 \fnil Segoe UI;}{\f1 \fnil \fcharset0 Segoe UI;}{\f2 \fnil Times New Mariusz;}}  {\colortbl ;\reg346\lcgae932\bhaw935 ;\red0\green0\blue0 ;\red0\green0\gzlk853 ;\red0\green0\blue0 ;}  {\stylesheet{\f0\fs20 Normal;}{\cs1 Default Paragraph Font;}{\cs2\f0\fs16 Line Number;}{\cs3\f2\fs24\ul\cf3 Hyperlink;}}  {\*\revtbl{Unknown;}}  \acbaoy87364\admkyf77919\ckxnc7245\iczmm5980\oyxfh9398\bgfly1130\mwrtfrh387\uwalfys062\nogrowautofit\mijnnh506\formshade\nofeaturethrottle1\dntblnsbdb\fet4\aendnotes\aftnnrlc\pgbrdrhead\pgbrdrfoot  \sectd\fvhpeo90768\puoqde04048\guttersxn0\gggdyeqz9960\czqngmus3415\igghajby0826\xfyrfrtk7216\dhghvcg172\mujhjad756\sbkpage\pgncont\pgndec  \plain\plain\f0\fs24\ql\plain\f0\fs24\plain\f0\fs20\cqxj1954\hich\f0\dbch\f0\loch\f0\fs20 O/N: ADEEL, VSS \par  \par  \par  \par  \par  \par  \par  \par  \par  \par  \par  \par  \par  \par  \par  \plain\f1\fs16\mhhk8082\hich\f1\dbch\f1\loch\f1\cf2\fs16 A/P: 67y Female s/p L renal angiogram/venogram and embolization of L renal AVM\par  Diet: regular\par  Pain/nausea control\par  Eliquis\par  \par  \plain\f1\fs16\wbyj6043\hich\f1\dbch\f1\loch\f1\cf2\fs16\strike\plain\f1\fs16\lpww7915\hich\f1\dbch\f1\loch\f1\cf2\fs16\plain\f0\fs20\sodg0509\hich\f0\dbch\f0\loch\f0\fs20\par  }   O/N: ADEEL, VSS       Subjective: Pt seen and examined at bedside. No complaints.    ROS:   Denies Headache, blurred vision, Chest Pain, SOB, Abdominal pain, nausea or vomiting     Social   apixaban 10      Allergies    Allergy Status Unknown    Intolerances        Vital Signs Last 24 Hrs  T(C): 36.7 (25 Aug 2023 05:32), Max: 36.7 (24 Aug 2023 16:00)  T(F): 98 (25 Aug 2023 05:32), Max: 98.1 (24 Aug 2023 20:12)  HR: 44 (25 Aug 2023 05:32) (40 - 50)  BP: 131/60 (25 Aug 2023 05:32) (131/60 - 155/67)  BP(mean): 87 (25 Aug 2023 05:32) (87 - 103)  RR: 18 (25 Aug 2023 05:32) (18 - 18)  SpO2: 97% (25 Aug 2023 05:32) (94% - 98%)    Parameters below as of 25 Aug 2023 05:32  Patient On (Oxygen Delivery Method): room air      I&O's Summary    24 Aug 2023 07:01  -  25 Aug 2023 06:36  --------------------------------------------------------  IN: 810 mL / OUT: 1075 mL / NET: -265 mL        Physical Exam:  Gen: NAD, resting comfortably in bed  C/V: bradycardic  Pulm: Nonlabored breathing, no respiratory distress, on room air  Abd: soft, NT/ND  Extrem: Right groin dressing c/d/i, groin soft, nontender, no palpable hematoma  Pulses: b/l DP/PT palpable 2+        LABS:                        12.8   8.18  )-----------( 169      ( 25 Aug 2023 05:39 )             39.8     08-25    141  |  107  |  16  ----------------------------<  92  3.9   |  26  |  0.82    Ca    9.2      25 Aug 2023 05:39  Phos  3.6     08-25  Mg     1.9     08-25            A/P: 67y Female s/p L renal angiogram/venogram and embolization of L renal AVM      Vascular/PAD:  -s/p L renal angiogram/venogram and embolization of L renal AVM  -continue Eliquis  -pain control    HTN/HLD:  -continue atorvastatin    Hypothyroidism  -continue levothyroxine    Anxiety  -continue xanax prn    Diet: regular    Activity: ambulate as tolerated    DVTPPx: on Eliquis    Dispo: d/c home today                            A/P: 67y Female s/p L renal angiogram/venogram and embolization of L renal AVM  Diet: regular  Pain/nausea control  Eliquis

## 2023-08-25 NOTE — DISCHARGE NOTE NURSING/CASE MANAGEMENT/SOCIAL WORK - PATIENT PORTAL LINK FT
You can access the FollowMyHealth Patient Portal offered by [facility] by registering at the following website: [email or web address]. By joining [facility]’s FollowMyHealth portal, you will also be able to view your health information using other applications (apps) compatible with our system.

## 2023-08-29 DIAGNOSIS — E03.9 HYPOTHYROIDISM, UNSPECIFIED: ICD-10-CM

## 2023-08-29 DIAGNOSIS — Q27.34 ARTERIOVENOUS MALFORMATION OF RENAL VESSEL: ICD-10-CM

## 2023-08-29 DIAGNOSIS — E78.5 HYPERLIPIDEMIA, UNSPECIFIED: ICD-10-CM

## 2023-08-29 DIAGNOSIS — Z87.891 PERSONAL HISTORY OF NICOTINE DEPENDENCE: ICD-10-CM

## 2023-08-29 DIAGNOSIS — I82.3 EMBOLISM AND THROMBOSIS OF RENAL VEIN: ICD-10-CM

## 2023-08-29 DIAGNOSIS — F41.9 ANXIETY DISORDER, UNSPECIFIED: ICD-10-CM

## 2023-09-12 ENCOUNTER — APPOINTMENT (OUTPATIENT)
Dept: VASCULAR SURGERY | Facility: CLINIC | Age: 68
End: 2023-09-12
Payer: MEDICARE

## 2023-09-12 VITALS
SYSTOLIC BLOOD PRESSURE: 156 MMHG | BODY MASS INDEX: 19.49 KG/M2 | DIASTOLIC BLOOD PRESSURE: 84 MMHG | HEART RATE: 68 BPM | HEIGHT: 63 IN | WEIGHT: 110 LBS

## 2023-09-12 VITALS — SYSTOLIC BLOOD PRESSURE: 158 MMHG | DIASTOLIC BLOOD PRESSURE: 92 MMHG

## 2023-09-12 PROCEDURE — 99213 OFFICE O/P EST LOW 20 MIN: CPT

## 2023-09-12 PROCEDURE — 93979 VASCULAR STUDY: CPT

## 2023-09-12 RX ORDER — APIXABAN 5 MG/1
5 TABLET, FILM COATED ORAL
Qty: 42 | Refills: 0 | Status: ACTIVE | COMMUNITY
Start: 2023-09-12 | End: 1900-01-01

## 2023-09-14 RX ORDER — RIVAROXABAN 20 MG/1
20 TABLET, FILM COATED ORAL DAILY
Qty: 1 | Refills: 0 | Status: ACTIVE | COMMUNITY
Start: 2023-09-14 | End: 1900-01-01

## 2023-12-11 DIAGNOSIS — I77.0 ARTERIOVENOUS FISTULA, ACQUIRED: ICD-10-CM

## 2024-01-15 ENCOUNTER — OFFICE (OUTPATIENT)
Dept: URBAN - METROPOLITAN AREA CLINIC 63 | Facility: CLINIC | Age: 69
Setting detail: OPHTHALMOLOGY
End: 2024-01-15
Payer: MEDICARE

## 2024-01-15 DIAGNOSIS — D31.31: ICD-10-CM

## 2024-01-15 PROCEDURE — 92250 FUNDUS PHOTOGRAPHY W/I&R: CPT | Performed by: OPHTHALMOLOGY

## 2024-01-15 PROCEDURE — 92014 COMPRE OPH EXAM EST PT 1/>: CPT | Performed by: OPHTHALMOLOGY

## 2024-01-15 ASSESSMENT — REFRACTION_AUTOREFRACTION
OD_CYLINDER: -0.50
OS_AXIS: 175
OS_SPHERE: +2.00
OD_SPHERE: +2.25
OD_AXIS: 85
OS_CYLINDER: -1.00

## 2024-01-15 ASSESSMENT — CONFRONTATIONAL VISUAL FIELD TEST (CVF)
OD_FINDINGS: FULL
OS_FINDINGS: FULL

## 2024-01-15 ASSESSMENT — SPHEQUIV_DERIVED
OD_SPHEQUIV: 2
OS_SPHEQUIV: 1.5

## 2024-07-24 ENCOUNTER — NON-APPOINTMENT (OUTPATIENT)
Age: 69
End: 2024-07-24

## 2024-10-21 ENCOUNTER — OFFICE (OUTPATIENT)
Dept: URBAN - METROPOLITAN AREA CLINIC 63 | Facility: CLINIC | Age: 69
Setting detail: OPHTHALMOLOGY
End: 2024-10-21
Payer: MEDICARE

## 2024-10-21 DIAGNOSIS — D31.31: ICD-10-CM

## 2024-10-21 PROCEDURE — 92014 COMPRE OPH EXAM EST PT 1/>: CPT | Performed by: OPHTHALMOLOGY

## 2024-10-21 PROCEDURE — 92250 FUNDUS PHOTOGRAPHY W/I&R: CPT | Performed by: OPHTHALMOLOGY

## 2024-10-21 ASSESSMENT — REFRACTION_AUTOREFRACTION
OD_AXIS: 85
OD_SPHERE: +2.25
OS_CYLINDER: -1.00
OS_AXIS: 175
OS_SPHERE: +2.00
OD_CYLINDER: -0.50

## 2024-10-21 ASSESSMENT — TONOMETRY
OD_IOP_MMHG: 18
OS_IOP_MMHG: 18

## 2024-10-21 ASSESSMENT — CONFRONTATIONAL VISUAL FIELD TEST (CVF)
OD_FINDINGS: FULL
OS_FINDINGS: FULL

## 2024-10-21 ASSESSMENT — VISUAL ACUITY
OD_BCVA: 20/20
OS_BCVA: 20/20

## 2025-01-07 NOTE — H&P ADULT - NSCORESITESY/N_GEN_A_CORE_RD
2025     [place] (:  1951) is a 73 y.o. female, here for evaluation of the following medical concerns:    Edema  This is a new problem. The current episode started 1 to 4 weeks ago (patient has had increased edema to the bilateral lower extremities over the last 3-4 weeks duration). The problem occurs constantly. The problem has been gradually improving (perhaps slightly better since taking the lasix that was prescribed 24). Associated symptoms include myalgias (right lateral lower ankle/lower leg). Pertinent negatives include no chest pain, chills, coughing, fatigue, fever, numbness or rash. Associated symptoms comments: Patient had underwent lumbar laminectomy with fusion on 24.  Had severe radicular pain down both lower extremities ever since.  This has somewhat improved, but was still having residual nerve pain, now just on the right lateral lower leg.  Started on Gabapentin.  Since then had developed swelling.  Has been wearing compression stockings with minimal improvement.  States she does try to keep feet elevated.  Yesterday noticed some erythema to the top of the left foot and is concerned this could possible be an infection.  Did switch her to lyrica last week when it was felt that the gabapentin was causing the swelling and that hasn't helped the pain and the swelling didn't change with the medication change.  Pain in the right lateral lower leg is primarily at night when she is trying to sleep.  Did use a lidocaine patch the area with little relief.  No shortness of breath.  No fever or chills.  No chest pain or palpitations..     Did review patient's med list, allergies, social history,pmhx and pshx today as noted in the record.      Review of Systems   Constitutional:  Negative for chills, fatigue and fever.   Respiratory:  Negative for cough, chest tightness, shortness of breath and wheezing.    Cardiovascular:  Positive for leg swelling. Negative for chest pain 
No

## 2025-05-19 ENCOUNTER — OFFICE (OUTPATIENT)
Dept: URBAN - METROPOLITAN AREA CLINIC 63 | Facility: CLINIC | Age: 70
Setting detail: OPHTHALMOLOGY
End: 2025-05-19
Payer: MEDICARE

## 2025-05-19 DIAGNOSIS — D31.31: ICD-10-CM

## 2025-05-19 PROCEDURE — 92134 CPTRZ OPH DX IMG PST SGM RTA: CPT | Performed by: OPHTHALMOLOGY

## 2025-05-19 PROCEDURE — 92014 COMPRE OPH EXAM EST PT 1/>: CPT | Performed by: OPHTHALMOLOGY

## 2025-05-19 ASSESSMENT — CONFRONTATIONAL VISUAL FIELD TEST (CVF)
OD_FINDINGS: FULL
OS_FINDINGS: FULL

## 2025-05-19 ASSESSMENT — TONOMETRY
OD_IOP_MMHG: 16
OS_IOP_MMHG: 16

## 2025-05-19 ASSESSMENT — REFRACTION_AUTOREFRACTION
OS_AXIS: 175
OD_CYLINDER: -0.50
OS_SPHERE: +2.00
OD_AXIS: 85
OD_SPHERE: +2.25
OS_CYLINDER: -1.00

## 2025-05-19 ASSESSMENT — VISUAL ACUITY
OS_BCVA: 20/20
OD_BCVA: 20/20

## 2025-05-29 NOTE — PATIENT PROFILE ADULT - NSPROGENPREVTRANSF_GEN_A_NUR
Attempted to call patient, no answer. LVM with call back #.    no history of blood product transfusion

## 2025-06-02 NOTE — ADDENDUM
Behavioral Health Adult Initial Assessment (Multi format)    DATE: 2025    Adult Virtual: This visit was performed via live interactive two-way Video visit with patient's verbal consent. Clinician Location:Home Patient Location: Home.. Patient's identity was verified. The Consent for Treatment, which includes patient rights and the grievance procedure, was reviewed and signed by patient or legal representative as part of the pre check in process for their virtual appointment today. The Consent was reviewed verbally with the patient and/or legal representative by this provider and any concerns or questions were addressed. Information including the Missed Appointment Agreement, After Hours contact information, and Fee Schedule was sent to the patient via their secure patient portal for reference after the appointment. Limits of confidentiality, including that other providers have access to BH notes, was reviewed; patient verbalized understanding and agreement.    PATIENT: Christiana Campbell  PREFERRED NAME: Christiana  MRN: [id number] : 1991  AGE: 33 year old IDENTIFIED GENDER: female  PREFERRED PRONOUN: she/her    REFERRED BY:  PCP, self-referred    CHIEF COMPLAINT:   Patient view: “For the last 18 months to 2 years, there has been a lot of change going on and it has caught up with me.  I am not sure where I am headed and it has caused a lot of uneasiness.  Work, health, and personal changes.  And I am not sure how to manage.”    GOALS FOR TREATMENT:  Patient View: “to feel good about the path I am headed on; to feel as good mentally as I do physically.  Feel more at peace.”    POTENTIAL BARRIERS:  Patient View: “I do struggle with anxiety and depression - so trusting myself.  Changes in relationships.”    PATIENT STRENGTHS:  Patient View:  “I am ready to make these changes; motivated.  Determined and want therapy to be helpful.”    ONSET/BRIEF HISTORY OF PRESENTING PROBLEM/PRECIPITATING EVENTS : \"In January, I am  [FreeTextEntry1] : This note was written by Pedro Luis Flower, acting as a scribe for Dr. Sandro Rincon.  I, Dr. Sandro Rincon, have read and attest that all the information, medical decision-making, and discharge instructions within are true and accurate.  I, Dr. Sandro Rincon, personally performed the evaluation and management (E/M) services for this new patient.  That E/M includes conducting the initial examination, assessing all conditions, and establishing the plan of care.  Today, my ACP, Pedro Luis Flower, was here to observe my evaluation and management services for this patient to be followed going forward. getting  in October; we got engaged in December 2023.  In January, I became overwhelmed with everything.  I am making this commitment.  I had bariatric surgery in September, changed jobs in January.  Questioning all kinds of things.  Lots of changes with diet and eating - am I with a partner who is supporting these changes?  What I agreed to over a year ago is not necessarily what I want now.  I am growing in one way and he is not.\"    PSYCHIATRIC:    Frequency/Duration of Current Symptoms:    Neurodevelopmental symptoms: denied    Bipolar disorders and related symptoms: denied    Depressive symptoms: PHQ = 1, minimal depression.  Is on medications that are helpful.      Anxiety symptoms: NINI = 4, minimal anxiety symptoms.  Is on medications.  Racing thoughts, worrying.    Trauma and stressor related symptoms: denied    Dissociative symptoms: denied    Somatic symptoms: denied    Eating disorder symptoms: none currently; working with dietician since bariatric surgery    Sleep/wake symptoms: has sleep apnea - uses a Cpap machine    Disruptive, impulse, conduct symptoms: denied    AODA and gambling symptoms: denied     Personality disorders symptoms: none noted    Gender symptoms: denied    OCD symptoms: denied    ADHD symptoms: denied; does have difficulty staying on task at times    Suicide Risk Assessment (suicide attempt in the last 24 hours?, current suicidal thoughts, plan, consideration of method, access to means, indication of substance use or dependence, previous suicide attempts, how many/when/method, family members/loved ones who've committed suicide, recent deaths/losses/anniversary dates, making preparation for death, lack of support system, safety plan, given 24 hour access information) ([place] Suicide Screening Tool)    Denied suicidal ideation.  Does not know anyone who has committed suicidal.  There are firearms in the home - fiance has it locked in safe.  After Hours Emergency number was  provided via the patient portal.    Violence/Homicide Assessment (if yes, describe)    Threat made to harm or kill someone? Specific Individual? Name:  []  Yes   [x]  No     Access to weapon? Where is weapon? [x]  Yes   []  No     History of violence/aggressive behavior to others? []  Yes   [x]  No     History of significant damage to property?[]  Yes   [x]  No     Indication of substance abuse or dependence?      []  Yes   [x]  No     Witnessed violence or significant aggression? []  Yes   [x]  No       ADDICTION/SUBSTANCE ABUSE:    Does patient alfonso?   [x] No   [] Yes    If yes, please answer the following questions:    Have you ever felt the need to bet more and more money?                                              []  No   []  Yes       Have you ever had to lie to people important to you about how much you alfonso?        []  No   []  Yes       Does patient use tobacco?        []  Yes; type and how much:  []  Former; type and how long ago:  [x]  Never    Is patient willing to make a Quit Attempt?   []  No   []  Yes   [] Maybe    Alcohol Use    Do you drink alcohol?   []  Yes  -  answer the following questions [x]  No       Never (0) Monthly or less (1) 2-4 times a month (2) 2-3 times per week (3) 4 or more times a week (4)   1. How often do you have a drink containing alcohol?          1 or 2   (0) 3 or 4   (1) 5 or 6   (2) 7 to 9   (3) 10 or more   (4)   2. How many drinks containing alcohol do you have in a typical day when you are drinking? Number of standard drinks: 12oz. of beer, 5 oz. of Wine, 1-1.5 oz. of liquor          Never (0) Monthly or less (1) 2-4 times a month (2) 2-3 times per week (3) 4 or more times a week (4)   3. How often do you have (6) or more drinks on one occasion?        4. How often during the last year have you found that you were not able to stop drinking once you had started?        5. How often during the last year have you failed to do what was normally expected from you  because of drinking?        6. How often during the last year have you needed a first drink in the morning to get yourself going after a heavy drinking session?        7. How often during the last year have you had a feeling of guilt or remorse after drinking?        8. How often during the last year have you been unable to remember what happened the night before because you had been drinking?          No (0) Yes, but not in the last year (2) Yes, during the last year   (4)   9. Have you or someone else been injured as a result of your drinking?      10. Has a relative or friend, or a doctor or other healthy worker been concerned about your drinking or suggested you cut down?        Score:  0  Quit drinking alcohol in May 2024 prior to starting medications and surgery.  Stated was never a big drinker, alcoholism runs in the family.    DRUG / PRESCRIPTION MISUSE    Do you use drugs such as cannabis (marijuana, hash) solvents, tranquilizers, barbiturates, narcotics, cocaine, stimulants, hallucinogens, etc?  []  Yes - answer the following questions  [x]  No       In the statements “drug abuse” refers to (1) the use of prescribed or over the counter drugs in excess of the directions and (2) any non-medical use of drugs.   The various classes of drugs may include:  [] Barbiturates    [] Narcotics (e.g. heroin) [] Hallucinogens (e.g. LSD)  [] Cannabis (e.g. marijuana, hash)  [] Solvents   [] Tranquilizers (e.g. valium)  [] Cocaine     [] Stimulants (e.g. speed)    Remember that the questions do not include alcoholic beverages.   Please answer every question. If you have difficulty with a statement, then choose the response that is mostly right.      Yes  (1) No  (0)   1. Have you used drugs other than those required for medical reasons?     2. Have you abused prescription drugs?     3. Do you abuse more than one drug at a time?     4. Can you get through the week without using drugs?     5. Are you always able to stop  using drugs when you want to?     6. Have you had “blackouts” or “flashbacks” as a result of your drug use?     7. Do you ever feel bad or guilty about your drug use?     8. Does your spouse/significant other (or parents) ever complain about your involvement with drugs?     9. Has drug abuse created problems between you and your spouse/significant or parents?     10. Have you lost friends because of your use of drugs?     11. Have you neglected your family because of your use of drugs?     12. Have you been in trouble at work (or school) because of drug abuse?     13. Have you lost your job because of drug abuse?     14. Have you gotten into fights when under the influence of drugs?     15. Have you engaged in illegal activities in order to obtain drugs?     16. Have you been arrested for possession of illegal drugs?     17. Have you ever experienced withdrawal symptoms (felt sick) when you stopped taking drugs?     18. Have you had medical problems as a result of your drug use? (E.g. memory loss, hepatitis, convulsions, bleeding, etc.)     19. Have you gone to anyone for help for a drug problem?     20. Have you been involved in a treatment program specifically related to drug use?       Score:  0    A score of: indicates   0 No drug use problems reported   1-5 Low level of problems due to drug abuse   6-10 Moderate level of problems due to drug abuse   11-15 Substantial level of problems due to drug abuse   16-20 Severe level of problems due to drug abuse   *Drug Abuse Screening Test (DAST) was developed by Job Son, PhD, 1982    PAST AND/OR PRESENT MENTAL HEALTH OR SUBSTANCE USE TREATMENT (INCLUDING PSYCHIATRIC CARE)  (Name of facility, provider name, date(s), reason, level of care, medication):     Has been in individual therapy a few times.    Currently, PCP prescribes psychiatric medications.      FAMILY HISTORY    Relationship Status:  []  Single    []    (How long?)  []  Remarried (How  long?)  []    (How long?)   []   (How long?)  []   (# of times)    [x]  Unmarried Couple (How long?) engaged; about 5 years  []  Other:     Current living situation:  []  House    [x]  Apartment/Duplex    []  Group Home  []  Homeless    []  Nursing Home     []  Other:     Summary of family members: lives with Wilberto smiley; dog, two cats    Mother and stepfather (almost whole life) and younger sister - mother and sister are supportive    Does the patient want family or others involved in treatment or education and learning?      []  Yes    [x]  No    If yes, whom?    SOCIAL HISTORY:    Patient Support System:  Patient view: “mother, sister, Wilberto, close friends”    Social Activities (Describe exercise, leisure, recreational activities, hobbies, other interests): work out five days a week - strength training; wedding photography - part time job; read; spending time with friends    EDUCATIONAL/ VOCATIONAL:    []  Some high school  []  High School Diploma  []  GED  []  Some College   []  Technical/Trade School graduate   [x]  University/College graduate - BS in criminal justice  []  Graduate School or above  []   Training  []  Other:     Learning Difficulties? [] Yes If yes, describe:                       [x] No     Current Employment Status:  [x]   Employed   [] Unemployed    []  Retired    []  In School (where):    [] SSI/SSDI [] W2    Current Employment:  Place of Employment: [employer], [employer], Probation and [employer]  Position/How Lon-1/2 years  Job satisfaction: [x]  Yes   []  No   If no, describe:    Previous Employment History:  [x]  No problem  []  Laid off  []  Disciplinary Action []  Job Loss(es)   []  Employee/Employer Conflicts   []  Leave of Absence  []  Absenteeism    []  Alcohol/Drug Related Problems     Service:  []  Yes  [x]  No        Deployment:  []  Yes   []   No    Discharge Status: []  Active   []  General []   Honorable   []  Other than Honorable []  Bad Conduct   []  Dishonorable    If Other than Active, General, or Honorable, explain:    FINANCIAL:    [x]  None    []  Frequent Loans   []  Inability to Pay Loans     []  Poor Credit    []  Income Assistance  []  Mounting Bills   []  Gambling   []  Loss of Property  []  Bankruptcy []  Other:     LEGAL:    [x] No concerns [] Operating While Intoxicated   [] Driving Under Influence  [] Emergency [facility]  [] Court Stipulation [] Protective Custody   [] Charges Pending     [] Court Date/Pending Date:  [] On Probation/[place] Date:  Probation//Telephone Number:  [] Professional License Revocation       [] Arrests:   [] History of Incarceration   [] Divorce/Custody Proceedings  [] Other:     SPIRITUALITY:    Does patient have any spiritual or Mandaen considerations they would like to discuss in therapy?   [] Yes    [x] No     []  Unsure  If yes, describe:    CULTURAL:    Does patient have any cultural considerations they would like to discuss in therapy?  [] Yes    [x] No    []  Unsure    If yes, describe:    CURRENT AND PAST MEDICAL HISTORY:    Check which best describes patient's current health (per patient report):  []   Excellent    [x]  Good   []  Fair    []  Poor    []  Very Poor      Does the patient have any current or past medical conditions? [] Yes    [x] No    If yes, describe:    Per patient report, current medications the patient is taking: current list is accurate in Epic    Significant medical history, surgeries, hospitalizations for non-psychiatric medical conditions:   Past Medical History:   Diagnosis Date    ADHD (attention deficit hyperactivity disorder)     Anxiety     Binge eating 10/17/2022    Depressive disorder     DM type 2 (diabetes mellitus, type 2)  (CMD)     Elevated cholesterol     GERD (gastroesophageal reflux disease)     High blood pressure     Major depressive disorder, recurrent episode, moderate (CMD) 01/25/2022    TRACEY on  CPAP     PCOS (polycystic ovarian syndrome)     Varicella without complication       Past Surgical History:   Procedure Laterality Date    Esophagogastroduodenoscopy  01/06/2023    Knee arthroscopy w/ acl reconstruction Right 2007    Lap poonam en y gstrc bypas<150cm  09/30/2024    Dr Carbajal       MENTAL STATUS EXAM:  Appearance:  Well-groomed, good eye contact, appears stated age.   Behavior:  Calm, pleasant, interactive.   Cooperativity:  Cooperative, forthcoming, appears reliable.    Speech:  Normal rate, tone and volume.   Language:  No abnormality noted.    Mood:  Euthymic.  Affect:  Mood-congruent, stable, normal range.  Thought Process:  Linear, logical, goal directed.    Thought Content:  No overt delusions or abnormality noted.    Perception:  No hallucinations, not responding to internal stimuli.   Consciousness:  Awake and alert.   Orientation:  Oriented to person, place, time, and situation.   Musculoskeletal: No abnormal or involuntary muscle movements observed.  Memory:  Good, able to demonstrate accurate historical recall for recent and more remote events and discussions.  Attention:  Good, no fluctuation or obvious deficit noted and able to follow the conversation.   Fund of Knowledge:  Consistent with education and experiences as evidenced by vocabulary.   Insight:  Good, based on appropriate recognition of impact of psychiatric symptoms and need for treatment.   Judgment:  Good, based on recent decisions.  Motivation to pursue treatment:  Good.      CLINICAL SUMMARY/IMPRESSIONS: Christiana presented for therapy alone.  She stated that she requested therapy as she has experienced numerous changes in her life and is wanting help with managing her emotions about these changes.  See above for further details.  Supportive listening and feedback were provided to begin to establish rapport.     PRIMARY DIAGNOSIS: Mild episode of recurrent major depressive disorder (CMD)  (primary encounter  diagnosis)  Generalized anxiety disorder     Referred for Individual Psychotherapy:  [x] Yes, Estimated Length of Treatment:  [] No, Assessment Only  [] No, Referred to Higher Level of Care    Referred for Group Treatment:  not at this time  [] Patient accepted recommendation   Group cohort recommendation:    [] Coping with Cancer Group   [] Dialectical Behavioral Therapy Group   [] Eating Disorder Group   [] Acceptance and Commitment Therapy Group   [] Cognitive Behavioral Therapy Group   [] Anxiety Group   [] Other:    [] Patient declined recommendation and reason:      [] Patient is not clinically appropriate for group at this time and reason:    Additional Referrals:    [] Refer for Medication Assessment   [] Refer to Alcohol or Drug Treatment   [] Other:     Next session: Provider and patient will collaboratively develop a treatment plan likely using cognitive behavioral strategies to target symptoms of depression and anxiety based on Christiana's strengths. Christiana was advised of 24-hour emergency access information.       Shira Salinas PSYD